# Patient Record
Sex: MALE | Race: BLACK OR AFRICAN AMERICAN | NOT HISPANIC OR LATINO | Employment: UNEMPLOYED | URBAN - METROPOLITAN AREA
[De-identification: names, ages, dates, MRNs, and addresses within clinical notes are randomized per-mention and may not be internally consistent; named-entity substitution may affect disease eponyms.]

---

## 2023-04-24 ENCOUNTER — HOSPITAL ENCOUNTER (EMERGENCY)
Facility: HOSPITAL | Age: 63
Discharge: HOME | End: 2023-04-25
Attending: EMERGENCY MEDICINE
Payer: COMMERCIAL

## 2023-04-24 DIAGNOSIS — L03.90 CELLULITIS, UNSPECIFIED CELLULITIS SITE: ICD-10-CM

## 2023-04-24 DIAGNOSIS — L02.11 NECK ABSCESS: Primary | ICD-10-CM

## 2023-04-24 LAB
ALBUMIN SERPL BCP-MCNC: 4 G/DL (ref 3.5–5.2)
ALP SERPL-CCNC: 70 U/L (ref 55–135)
ALT SERPL W/O P-5'-P-CCNC: 20 U/L (ref 10–44)
ANION GAP SERPL CALC-SCNC: 9 MMOL/L (ref 8–16)
AST SERPL-CCNC: 27 U/L (ref 10–40)
BASOPHILS # BLD AUTO: 0.04 K/UL (ref 0–0.2)
BASOPHILS NFR BLD: 0.4 % (ref 0–1.9)
BILIRUB SERPL-MCNC: 0.7 MG/DL (ref 0.1–1)
BUN SERPL-MCNC: 15 MG/DL (ref 8–23)
CALCIUM SERPL-MCNC: 9.5 MG/DL (ref 8.7–10.5)
CHLORIDE SERPL-SCNC: 105 MMOL/L (ref 95–110)
CO2 SERPL-SCNC: 21 MMOL/L (ref 23–29)
CREAT SERPL-MCNC: 1.1 MG/DL (ref 0.5–1.4)
DIFFERENTIAL METHOD: NORMAL
EOSINOPHIL # BLD AUTO: 0.1 K/UL (ref 0–0.5)
EOSINOPHIL NFR BLD: 0.9 % (ref 0–8)
ERYTHROCYTE [DISTWIDTH] IN BLOOD BY AUTOMATED COUNT: 12.1 % (ref 11.5–14.5)
EST. GFR  (NO RACE VARIABLE): >60 ML/MIN/1.73 M^2
GLUCOSE SERPL-MCNC: 97 MG/DL (ref 70–110)
HCT VFR BLD AUTO: 45.5 % (ref 40–54)
HGB BLD-MCNC: 15.8 G/DL (ref 14–18)
IMM GRANULOCYTES # BLD AUTO: 0.03 K/UL (ref 0–0.04)
IMM GRANULOCYTES NFR BLD AUTO: 0.3 % (ref 0–0.5)
LYMPHOCYTES # BLD AUTO: 3.1 K/UL (ref 1–4.8)
LYMPHOCYTES NFR BLD: 27.8 % (ref 18–48)
MCH RBC QN AUTO: 28.5 PG (ref 27–31)
MCHC RBC AUTO-ENTMCNC: 34.7 G/DL (ref 32–36)
MCV RBC AUTO: 82 FL (ref 82–98)
MONOCYTES # BLD AUTO: 0.8 K/UL (ref 0.3–1)
MONOCYTES NFR BLD: 7.4 % (ref 4–15)
NEUTROPHILS # BLD AUTO: 7 K/UL (ref 1.8–7.7)
NEUTROPHILS NFR BLD: 63.2 % (ref 38–73)
NRBC BLD-RTO: 0 /100 WBC
PLATELET # BLD AUTO: 235 K/UL (ref 150–450)
PMV BLD AUTO: 9.8 FL (ref 9.2–12.9)
POTASSIUM SERPL-SCNC: 4.9 MMOL/L (ref 3.5–5.1)
PROT SERPL-MCNC: 8.5 G/DL (ref 6–8.4)
RBC # BLD AUTO: 5.55 M/UL (ref 4.6–6.2)
SODIUM SERPL-SCNC: 135 MMOL/L (ref 136–145)
WBC # BLD AUTO: 10.98 K/UL (ref 3.9–12.7)

## 2023-04-24 PROCEDURE — 99285 EMERGENCY DEPT VISIT HI MDM: CPT | Mod: 25

## 2023-04-24 PROCEDURE — 85025 COMPLETE CBC W/AUTO DIFF WBC: CPT | Performed by: PHYSICIAN ASSISTANT

## 2023-04-24 PROCEDURE — 80053 COMPREHEN METABOLIC PANEL: CPT | Performed by: PHYSICIAN ASSISTANT

## 2023-04-24 PROCEDURE — 25500020 PHARM REV CODE 255: Performed by: EMERGENCY MEDICINE

## 2023-04-24 RX ADMIN — IOHEXOL 100 ML: 350 INJECTION, SOLUTION INTRAVENOUS at 10:04

## 2023-04-24 NOTE — Clinical Note
Mrs. Barnett accompanied their spouse to the emergency department on 4/24/2023. They may return to work on 04/26/2023.      If you have any questions or concerns, please don't hesitate to call.      Martha Dominguez RN

## 2023-04-24 NOTE — Clinical Note
Mago Mahendra accompanied their family member to the emergency department on 4/24/2023. They may return to work on 04/26/2023.      If you have any questions or concerns, please don't hesitate to call.      Martha Dominguez RN

## 2023-04-24 NOTE — Clinical Note
"Date: 4/25/2023  Patient: Surendra Barnett  Admitted: 4/24/2023  9:04 PM  Attending Provider: Juan Leach Jr., MD    Surendra Barnett or his authorized caregiver has made the decision for the patient to leave the emergency department against the advi ce of his attending physician. He or his authorized caregiver has been informed and understands the inherent risks, including death, worsening of neck swelling, airway obstruction, infection, DEATH.  He or his authorized caregiver has decided to acce pt the responsibility for this decision. Surendra Barnett and all necessary parties have been advised that he may return for further evaluation or treatment. His condition at time of discharge was stable.  Surendra Barnett had current vital signs as fo llows:  BP (!) 166/80   Pulse 69   Temp 99.1 °F (37.3 °C) (Oral)   Resp 18   Ht 6' 4" (1.93 m)   Wt 113 kg (249 lb 1.9 oz)   "

## 2023-04-24 NOTE — Clinical Note
Mrs. Barnett accompanied their spouse to the emergency department on 4/24/2023. They may return to work on 04/26/2023.      If you have any questions or concerns, please don't hesitate to call.      Martha Dominguez MD

## 2023-04-25 VITALS
OXYGEN SATURATION: 95 % | HEART RATE: 69 BPM | DIASTOLIC BLOOD PRESSURE: 86 MMHG | RESPIRATION RATE: 18 BRPM | SYSTOLIC BLOOD PRESSURE: 151 MMHG | TEMPERATURE: 99 F | WEIGHT: 249.13 LBS | HEIGHT: 76 IN | BODY MASS INDEX: 30.34 KG/M2

## 2023-04-25 PROBLEM — L02.11 NECK ABSCESS: Status: ACTIVE | Noted: 2023-04-25

## 2023-04-25 PROBLEM — L03.90 CELLULITIS: Status: ACTIVE | Noted: 2023-04-25

## 2023-04-25 PROCEDURE — 96365 THER/PROPH/DIAG IV INF INIT: CPT

## 2023-04-25 PROCEDURE — 96375 TX/PRO/DX INJ NEW DRUG ADDON: CPT

## 2023-04-25 PROCEDURE — 87040 BLOOD CULTURE FOR BACTERIA: CPT | Performed by: EMERGENCY MEDICINE

## 2023-04-25 PROCEDURE — 25000003 PHARM REV CODE 250: Performed by: EMERGENCY MEDICINE

## 2023-04-25 PROCEDURE — 63600175 PHARM REV CODE 636 W HCPCS: Performed by: EMERGENCY MEDICINE

## 2023-04-25 RX ORDER — DEXAMETHASONE SODIUM PHOSPHATE 4 MG/ML
4 INJECTION, SOLUTION INTRA-ARTICULAR; INTRALESIONAL; INTRAMUSCULAR; INTRAVENOUS; SOFT TISSUE
Status: COMPLETED | OUTPATIENT
Start: 2023-04-25 | End: 2023-04-25

## 2023-04-25 RX ORDER — METHYLPREDNISOLONE 4 MG/1
TABLET ORAL
Qty: 1 EACH | Refills: 0 | Status: SHIPPED | OUTPATIENT
Start: 2023-04-25

## 2023-04-25 RX ORDER — CLINDAMYCIN PHOSPHATE 900 MG/50ML
900 INJECTION, SOLUTION INTRAVENOUS
Status: COMPLETED | OUTPATIENT
Start: 2023-04-25 | End: 2023-04-25

## 2023-04-25 RX ORDER — CLINDAMYCIN HYDROCHLORIDE 150 MG/1
300 CAPSULE ORAL 4 TIMES DAILY
Qty: 56 CAPSULE | Refills: 0 | Status: SHIPPED | OUTPATIENT
Start: 2023-04-25 | End: 2023-05-02

## 2023-04-25 RX ADMIN — DEXAMETHASONE SODIUM PHOSPHATE 4 MG: 4 INJECTION INTRA-ARTICULAR; INTRALESIONAL; INTRAMUSCULAR; INTRAVENOUS; SOFT TISSUE at 12:04

## 2023-04-25 RX ADMIN — CLINDAMYCIN PHOSPHATE 900 MG: 900 INJECTION, SOLUTION INTRAVENOUS at 12:04

## 2023-04-25 NOTE — FIRST PROVIDER EVALUATION
Emergency Department TeleTriage Encounter Note      CHIEF COMPLAINT    Chief Complaint   Patient presents with    Neck Pain     Pt has some swelling to R side of neck. Happened today. Has not had any s/s prior to today. Airway intact.       VITAL SIGNS   Initial Vitals [04/24/23 1915]   BP Pulse Resp Temp SpO2   (!) 191/86 69 14 99.1 °F (37.3 °C) 97 %      MAP       --            ALLERGIES    Review of patient's allergies indicates:  No Known Allergies    PROVIDER TRIAGE NOTE  Patient presents with complaint of swelling to rigth side of neck with associated pain. Reports no fever. No trauma.      Phy:   Constitutional: well nourished, well developed, appearing stated age, NAD   HEENT: NCAT, symmetrical lids, No obvious facial deformity.  Normal phonation. Normal Conjunctiva   Neck: NAROM   Respiratory: Normal effort.  No obvious use of accessory muscles   Musculoskeletal: Moved upper extremities well   Neuro: Alert, answers questions appropriately    Psych: appropriate mood and affect      Initial orders will be placed and care will be transferred to an alternate provider when patient is roomed for a full evaluation. Any additional orders and the final disposition will be determined by that provider.        ORDERS  Labs Reviewed - No data to display    ED Orders (720h ago, onward)      Start Ordered     Status Ordering Provider    04/24/23 1954 04/24/23 1953  CBC auto differential  STAT         Ordered JOSÉ LUIS SANFORD    04/24/23 1954 04/24/23 1953  Comprehensive metabolic panel  STAT         Ordered JOSÉ LUIS SANFORD              Virtual Visit Note: The provider triage portion of this emergency department evaluation and documentation was performed via [employer], a HIPAA-compliant telemedicine application, in concert with a tele-presenter in the room. A face to face patient evaluation with one of my colleagues will occur once the patient is placed in an emergency department  room.      DISCLAIMER: This note was prepared with Spot[employer] voice recognition transcription software. Garbled syntax, mangled pronouns, and other bizarre constructions may be attributed to that software system.

## 2023-04-25 NOTE — ED PROVIDER NOTES
SCRIBE #1 NOTE: I, Pat Rolle, am scribing for, and in the presence of, Juan Leach Jr., MD. I have scribed the entire note.       History     Chief Complaint   Patient presents with    Neck Pain     Pt has some swelling to R side of neck. Happened today. Has not had any s/s prior to today. Airway intact.     Review of patient's allergies indicates:  No Known Allergies      History of Present Illness     HPI    4/24/2023, 9:06 PM  History obtained from the patient      History of Present Illness: Surendra Barnett is a 62 y.o. male patient who presents to the Emergency Department for evaluation of neck pain and swelling which onset this morning. Symptoms are constant and moderate in severity. No mitigating or exacerbating factors reported. Associated sxs include trouble swallowing. Patient denies any sore throat, rhinorrhea, CP, abdominal pain, N/V, and all other sxs at this time. No prior Tx reported. No further complaints or concerns at this time.       Arrival mode: Personal vehicle     PCP: Humberto Mullins MD        Past Medical History:  No past medical history on file.    Past Surgical History:  No past surgical history on file.      Family History:  No family history on file.    Social History:  Social History     Tobacco Use    Smoking status: Every Day     Types: Cigarettes    Smokeless tobacco: Current   Substance and Sexual Activity    Alcohol use: Not on file    Drug use: Not on file    Sexual activity: Not on file        Review of Systems     Review of Systems   Constitutional:  Negative for fever.   HENT:  Positive for trouble swallowing. Negative for rhinorrhea and sore throat.    Respiratory:  Negative for shortness of breath.    Cardiovascular:  Negative for chest pain.   Gastrointestinal:  Negative for abdominal pain, nausea and vomiting.   Genitourinary:  Negative for dysuria.   Musculoskeletal:  Positive for neck pain (+ swelling). Negative for back pain.   Skin:  Negative for rash.  "  Neurological:  Negative for weakness.   Hematological:  Does not bruise/bleed easily.   All other systems reviewed and are negative.     Physical Exam     Initial Vitals [04/24/23 1915]   BP Pulse Resp Temp SpO2   (!) 191/86 69 14 99.1 °F (37.3 °C) 97 %      MAP       --          Physical Exam  Nursing Notes and Vital Signs Reviewed.  Constitutional: Patient is in no apparent distress. Well-developed and well-nourished.  Head: Atraumatic. Normocephalic.  Eyes: PERRL. EOM intact. Conjunctivae are not pale. No scleral icterus.  ENT: Able to manage oral secretions. Mucous membranes are moist. Oropharynx is clear and symmetric.    Neck: Right anterior neck mass, 3 x 5 cm, tender, skin intact. Supple. Full ROM. No lymphadenopathy. Airways intact.   Cardiovascular: Regular rate. Regular rhythm. No murmurs, rubs, or gallops. Distal pulses are 2+ and symmetric.  Pulmonary/Chest: No respiratory distress. Clear to auscultation bilaterally. No wheezing or rales.  Abdominal: Soft and non-distended.  There is no tenderness.  No rebound, guarding, or rigidity. Good bowel sounds.  Genitourinary: No CVA tenderness  Musculoskeletal: Moves all extremities. No obvious deformities. No edema. No calf tenderness.  Skin: Warm and dry.  Neurological:  Alert, awake, and appropriate.  Normal speech.  No acute focal neurological deficits are appreciated.  Psychiatric: Normal affect. Good eye contact. Appropriate in content.     ED Course   Procedures  ED Vital Signs:  Vitals:    04/24/23 1915 04/24/23 2004 04/24/23 2112 04/24/23 2202   BP: (!) 191/86 (!) 164/80 (!) 175/85 (!) 154/71   Pulse: 69 64 67 62   Resp: 14 18 17 16   Temp: 99.1 °F (37.3 °C)      TempSrc: Oral      SpO2: 97% 97% 99% 95%   Weight: 113 kg (249 lb 1.9 oz)      Height: 6' 4" (1.93 m)       04/24/23 2302 04/25/23 0002 04/25/23 0102 04/25/23 0132   BP: (!) 166/80 (!) 153/80 (!) 173/85 (!) 151/86   Pulse: 69 66 64 69   Resp: 18      Temp:       TempSrc:       SpO2: 98% 96% " 97% 95%   Weight:       Height:           Abnormal Lab Results:  Labs Reviewed   COMPREHENSIVE METABOLIC PANEL - Abnormal; Notable for the following components:       Result Value    Sodium 135 (*)     CO2 21 (*)     Total Protein 8.5 (*)     All other components within normal limits   CULTURE, BLOOD   CULTURE, BLOOD   CBC W/ AUTO DIFFERENTIAL        All Lab Results:  Results for orders placed or performed during the hospital encounter of 04/24/23   Blood Culture #1 **CANNOT BE ORDERED STAT**    Specimen: Peripheral, Antecubital, Right; Blood   Result Value Ref Range    Blood Culture, Routine No Growth to date     Blood Culture, Routine No Growth to date     Blood Culture, Routine No Growth to date     Blood Culture, Routine No Growth to date    Blood Culture #2 **CANNOT BE ORDERED STAT**    Specimen: Peripheral, Forearm, Left; Blood   Result Value Ref Range    Blood Culture, Routine No Growth to date     Blood Culture, Routine No Growth to date     Blood Culture, Routine No Growth to date     Blood Culture, Routine No Growth to date    CBC auto differential   Result Value Ref Range    WBC 10.98 3.90 - 12.70 K/uL    RBC 5.55 4.60 - 6.20 M/uL    Hemoglobin 15.8 14.0 - 18.0 g/dL    Hematocrit 45.5 40.0 - 54.0 %    MCV 82 82 - 98 fL    MCH 28.5 27.0 - 31.0 pg    MCHC 34.7 32.0 - 36.0 g/dL    RDW 12.1 11.5 - 14.5 %    Platelets 235 150 - 450 K/uL    MPV 9.8 9.2 - 12.9 fL    Immature Granulocytes 0.3 0.0 - 0.5 %    Gran # (ANC) 7.0 1.8 - 7.7 K/uL    Immature Grans (Abs) 0.03 0.00 - 0.04 K/uL    Lymph # 3.1 1.0 - 4.8 K/uL    Mono # 0.8 0.3 - 1.0 K/uL    Eos # 0.1 0.0 - 0.5 K/uL    Baso # 0.04 0.00 - 0.20 K/uL    nRBC 0 0 /100 WBC    Gran % 63.2 38.0 - 73.0 %    Lymph % 27.8 18.0 - 48.0 %    Mono % 7.4 4.0 - 15.0 %    Eosinophil % 0.9 0.0 - 8.0 %    Basophil % 0.4 0.0 - 1.9 %    Differential Method Automated    Comprehensive metabolic panel   Result Value Ref Range    Sodium 135 (L) 136 - 145 mmol/L    Potassium 4.9 3.5 -  5.1 mmol/L    Chloride 105 95 - 110 mmol/L    CO2 21 (L) 23 - 29 mmol/L    Glucose 97 70 - 110 mg/dL    BUN 15 8 - 23 mg/dL    Creatinine 1.1 0.5 - 1.4 mg/dL    Calcium 9.5 8.7 - 10.5 mg/dL    Total Protein 8.5 (H) 6.0 - 8.4 g/dL    Albumin 4.0 3.5 - 5.2 g/dL    Total Bilirubin 0.7 0.1 - 1.0 mg/dL    Alkaline Phosphatase 70 55 - 135 U/L    AST 27 10 - 40 U/L    ALT 20 10 - 44 U/L    Anion Gap 9 8 - 16 mmol/L    eGFR >60 >60 mL/min/1.73 m^2         Imaging Results:  Imaging Results              CT Soft Tissue Neck With Contrast (Final result)  Result time 04/24/23 23:25:14      Final result by Karthikeyan Rodas MD (04/24/23 23:25:14)                   Impression:      As above    All CT scans at this facility are performed  using dose modulation techniques as appropriate to performed exam including the following:  automated exposure control; adjustment of mA and/or kV according to the patients size (this includes techniques or standardized protocols for targeted exams where dose is matched to indication/reason for exam: i.e. extremities or head);  iterative reconstruction technique.      Electronically signed by: Karthikeyan Rodas  Date:    04/24/2023  Time:    23:25               Narrative:    EXAMINATION:  CT SOFT TISSUE NECK WITH CONTRAST    CLINICAL HISTORY:  Neck abscess, deep tissue;    TECHNIQUE:  Low dose axial images as well as sagittal and coronal reconstructions were performed from the skull base to the clavicles following the intravenous administration of 75 mL of Omnipaque 350.    COMPARISON:  None    FINDINGS:  There is fluid in the right parapharyngeal space suggestive of an abscess or inflammatory infectious process.  There is mild thickening of the right platysmas and mild fat stranding adjacent to the submandibular gland which appears edematous.  Right neck subcutaneous fat stranding extending to the level of the thyroid cartilage.  Asymmetric enlargement of the right submandibular gland.  No submandibular  stones.  Mild mucosal thickening of the sphenoid sinuses.  Cervical hardware with anterior fusion C5-C6.  Multiple subcutaneous cyst involving posterior subcutaneous fat stranding.  Fluid adjacent to the right Jammie epiglottic region at the level the hyoid bone suggestive of infectious process/abscess.                                                The Emergency Provider reviewed the vital signs and test results, which are outlined above.     ED Discussion     12:17 AM: Discussed pt's case with Dr. Medrano (Otolaryngology) who says that there is nothing to act on surgically at this time. Dr. Medrano suggests admission to obs.     12:34 AM: Discussed case with Micha Dahl MD ([facility]). Dr. Dahl agrees with current care and management of pt and accepts admission.   Admitting Service: [facility]  Admitting Physician: Dr. Dahl  Admit to: Inpatient med/tele    12:34 AM: Re-evaluated pt. I have discussed test results, shared treatment plan, and the need for admission with patient and family at bedside.     1:03 AM: Pt is A&O x3, appropriate, and of capacity at this time. Pt voices desire to leave hospital. D/w pt in length need for further evaluation and treatment due to HPI and PEx. Pt declines any further evaluation or tx at this time. All risks, including worsening sx, permanent bodily harm and death, were discussed in length. Pt acknowledges all risk at this time and agrees to sign AMA form. Pt given RTER instructions. All questions and concerns addressed at this time. Pt leaving AMA.     The patient is over the age of 18 years, exhibits no evidence of an altered level of consciousness, and possesses appropriate decision-making capacity based on their ability to understand and communicate rationally.  Patient was advised, that, for an optimal recovery, they should be responsible for complying with the individualized treatment plan provided today by the medical professionals at [facility].   This includes taking medications as directed, reviewing and understanding all discharge instructions, and scheduling any appointments that were recommended.  I informed the patient that failing to take responsibility for their health and safety and not complying with the recommendations provided to today is deemed to be against our medical advice. The patient was provided clear and verbal details regarding alternatives, benefits, risks, and complications related to their condition. In addition, we reiterated the seriousness of their condition and our recommendations for urgent follow-up care with a qualified healthcare provider capable of addressing their specific needs.  Furthermore, the patient was made aware of the serious complications that may be associated with their condition, including: stroke, permanent disability, paralysis, loss of limb(s), and death.  I have personally explained to the patient that choosing to leave against medical advise may result in permanent bodily harm or death. I again discussed at great length that without further evaluation and monitoring there may be unforeseen circumstances and/or deterioration causing permanent bodily harm or death as a result of his/her choice. The patient verbalized these risks back to me in laymans terms.  The patient was able to discuss the treatment that was recommended and, was aware of specific risks associated with the refusal of care relating to their specific condition.  Patient was instructed to return to the emergency department if their condition changes or they wish to comply with our treatment recommendations.        Medical Decision Making:   Clinical Tests:   Lab Tests: Ordered and Reviewed  Radiological Study: Ordered and Reviewed         ED Medication(s):  Medications   iohexoL (OMNIPAQUE 350) injection 100 mL (100 mLs Intravenous Given 4/24/23 3715)   dexAMETHasone injection 4 mg (4 mg Intravenous Given 4/25/23 0040)   clindamycin in D5W  900 mg/50 mL IVPB 900 mg (0 mg Intravenous Stopped 4/25/23 0145)       Discharge Medication List as of 4/25/2023  1:12 AM        START taking these medications    Details   clindamycin (CLEOCIN) 150 MG capsule Take 2 capsules (300 mg total) by mouth 4 (four) times daily. for 7 days, Starting Tue 4/25/2023, Until Tue 5/2/2023, Print      methylPREDNISolone (MEDROL DOSEPACK) 4 mg tablet use as directed, Print              Follow-up Information       [facility]. Schedule an appointment as soon as possible for a visit in 1 day.    Specialty: Otolaryngology  Contact information:  [address] [facility] [address]  [phone]             Humberto Mullins MD. Schedule an appointment as soon as possible for a visit in 1 day.    Specialty: Family Medicine  Contact information:  [address] [facility] [address]  [phone]               [facility] - Emergency Dept..    Specialty: Emergency Medicine  Why: As needed, If symptoms worsen  Contact information:  [address] [facility] [address] [phone]                               Scribe Attestation:   Scribe #1: I performed the above scribed service and the documentation accurately describes the services I performed. I attest to the accuracy of the note.     Attending:   Physician Attestation Statement for Scribe #1: I, Juan Leach Jr., MD, personally performed the services described in this documentation, as scribed by Pat Rolle, in my presence, and it is both accurate and complete.           Clinical Impression       ICD-10-CM ICD-9-CM   1. Neck abscess  L02.11 682.1   2. Cellulitis, unspecified cellulitis site  L03.90 682.9       Disposition:   Disposition: AMA  Condition: Stable       Juan Leach Jr., MD  04/28/23 1958

## 2023-04-26 ENCOUNTER — OFFICE VISIT (OUTPATIENT)
Dept: OTOLARYNGOLOGY | Facility: CLINIC | Age: 63
End: 2023-04-26
Payer: COMMERCIAL

## 2023-04-26 VITALS — WEIGHT: 247.81 LBS | BODY MASS INDEX: 30.16 KG/M2

## 2023-04-26 DIAGNOSIS — K11.5 SIALOLITHIASIS OF SUBMANDIBULAR GLAND: Primary | ICD-10-CM

## 2023-04-26 DIAGNOSIS — K11.20 SIALOADENITIS OF SUBMANDIBULAR GLAND: ICD-10-CM

## 2023-04-26 PROCEDURE — 3008F PR BODY MASS INDEX (BMI) DOCUMENTED: ICD-10-PCS | Mod: CPTII,S$GLB,, | Performed by: OTOLARYNGOLOGY

## 2023-04-26 PROCEDURE — 99204 PR OFFICE/OUTPT VISIT, NEW, LEVL IV, 45-59 MIN: ICD-10-PCS | Mod: 25,S$GLB,, | Performed by: OTOLARYNGOLOGY

## 2023-04-26 PROCEDURE — 1159F MED LIST DOCD IN RCRD: CPT | Mod: CPTII,S$GLB,, | Performed by: OTOLARYNGOLOGY

## 2023-04-26 PROCEDURE — 42335 PR REMOVAL SUBMAND STONE,COMPLICATED: ICD-10-PCS | Mod: S$GLB,,, | Performed by: OTOLARYNGOLOGY

## 2023-04-26 PROCEDURE — 99204 OFFICE O/P NEW MOD 45 MIN: CPT | Mod: 25,S$GLB,, | Performed by: OTOLARYNGOLOGY

## 2023-04-26 PROCEDURE — 42335 REMOVAL OF SALIVARY STONE: CPT | Mod: S$GLB,,, | Performed by: OTOLARYNGOLOGY

## 2023-04-26 PROCEDURE — 99999 PR PBB SHADOW E&M-EST. PATIENT-LVL II: CPT | Mod: PBBFAC,,, | Performed by: OTOLARYNGOLOGY

## 2023-04-26 PROCEDURE — 99999 PR PBB SHADOW E&M-EST. PATIENT-LVL II: ICD-10-PCS | Mod: PBBFAC,,, | Performed by: OTOLARYNGOLOGY

## 2023-04-26 PROCEDURE — 3008F BODY MASS INDEX DOCD: CPT | Mod: CPTII,S$GLB,, | Performed by: OTOLARYNGOLOGY

## 2023-04-26 PROCEDURE — 1159F PR MEDICATION LIST DOCUMENTED IN MEDICAL RECORD: ICD-10-PCS | Mod: CPTII,S$GLB,, | Performed by: OTOLARYNGOLOGY

## 2023-04-26 NOTE — PROGRESS NOTES
Referring Provider:    Humberto Mullins Md  [address] [facility]  [place] [address]  Subjective:   Patient: Surendra Barnett [id number], :1960   Visit date:2023 12:01 PM    Chief Complaint:  throat (Irritation in throat /Feels a knot , notice it on Monday )    HPI:    Prior notes reviewed by myself.  Clinical documentation obtained by nursing staff reviewed.      62-year-old gentleman presents for evaluation of right-sided neck swelling.  He was seen in the emergency department and had a CT scan that demonstrated inflammation around his right submandibular gland and possible parapharyngeal abscess.  It was recommended that he be admitted for IV antibiotics, but he decided to leave AMA on oral antibiotics.  He continues to have the same symptoms including neck swelling, odynophagia, neck pain.  He has not had any prior episodes      Objective:     Physical Exam:  Vitals:  Wt 112.4 kg (247 lb 12.8 oz)   BMI 30.16 kg/m²   General appearance:  Well developed, well nourished    Ears:  Otoscopy of external auditory canals and tympanic membranes was normal, clinical speech reception thresholds grossly intact, no mass/lesion of auricle.    Nose:  No masses/lesions of external nose, nasal mucosa, septum, and turbinates were within normal limits.    Mouth:  No mass/lesion of lips, teeth, gums, hard/soft palate, tongue, tonsils, or oropharynx. Palpable salivary stone blocking the puncta of the right submandibular duct, distension of duct visible with edema.    Neck & Lymphatics:  No cervical lymphadenopathy, enlarged and tender right submandibular gland, trachea is midline, no thyroid enlargement/tenderness/mass.        [x]  Data Reviewed:    Lab Results   Component Value Date    WBC 10.98 2023    HGB 15.8 2023    HCT 45.5 2023    MCV 82 2023    EOSINOPHIL 0.9 2023     Procedure:  Removal right submandibular salivary stone   Preprocedure Diagnosis:  submandibular  sialolithiasis  Postprocedure Diagnosis:    Same  Findings:  15 cc of purulent draiange  Complications:  None  Blood loss:  Minimal    Procedure in detail:  After written consent was obtained,  the right floor of mouth near the area of the submandibular duct was infiltrated with approximately 0.25 cc of 1% lidocaine with epinephrine.  After local anesthesia was achieved, the duct was grasped with Hodges's forceps and curved iris scissors were used to open the duct at which time the stone was expelled into the oral cavity and removed.  Copious turbid saliva was then expressed from the duct.  The patient tolerated the procedure well, and there were no complications.    Independent review:  Inflammation/infection within right SM gland and surrounding tissues.  CT Soft Tissue Neck With Contrast  Order: [id number]  Status: Final result     Visible to patient: No (inaccessible in Patient Portal)     Next appt: 05/10/2023 at 08:15 AM in Otolaryngology (Osei Grigsby MD)     0 Result Notes  Details    Reading Physician Reading Date Result Priority   Karthikeyan Rodas MD  [phone] 4/24/2023 STAT     Narrative & Impression  EXAMINATION:  CT SOFT TISSUE NECK WITH CONTRAST     CLINICAL HISTORY:  Neck abscess, deep tissue;     TECHNIQUE:  Low dose axial images as well as sagittal and coronal reconstructions were performed from the skull base to the clavicles following the intravenous administration of 75 mL of Omnipaque 350.     COMPARISON:  None     FINDINGS:  There is fluid in the right parapharyngeal space suggestive of an abscess or inflammatory infectious process.  There is mild thickening of the right platysmas and mild fat stranding adjacent to the submandibular gland which appears edematous.  Right neck subcutaneous fat stranding extending to the level of the thyroid cartilage.  Asymmetric enlargement of the right submandibular gland.  No submandibular stones.  Mild mucosal thickening of the sphenoid sinuses.  Cervical  hardware with anterior fusion C5-C6.  Multiple subcutaneous cyst involving posterior subcutaneous fat stranding.  Fluid adjacent to the right Jammie epiglottic region at the level the hyoid bone suggestive of infectious process/abscess.     Impression:     As above     All CT scans at this facility are performed  using dose modulation techniques as appropriate to performed exam including the following:  automated exposure control; adjustment of mA and/or kV according to the patients size (this includes techniques or standardized protocols for targeted exams where dose is matched to indication/reason for exam: i.e. extremities or head);  iterative reconstruction technique.        Electronically signed by: Karthikeyan Rodas  Date:                                            04/24/2023  Time:                                           23:25           Exam Ended: 04/24/23 22:47 Last Resulted: 04/24/23 23:25           Assessment & Plan:   Sialolithiasis of submandibular gland [K11.5 (ICD-10-CM)]    Sialoadenitis of submandibular gland       He had a small sialolith that was obstructing his right submandibular duct.  We performed a sialolith autonomy and remove this stone which decompressed his right submandibular gland.  He experienced immediate relief.  He already has a prescription for oral antibiotics which I instructed him to continue.  We also discussed conservative treatment for sialoadenitis as noted below.  He will follow-up for recheck.    His exam today is consistent with sialoadenitis of the right submandibular.  We also discussed conservative measures to help treat sialoadenitis, including massage, warm compresses, aggressive hydration, and oral sialogogues (lemon drops or peppermints).  I will see the patient back in 2-3 weeks with a CT to evaluate their progress.  If their condition worsens in any way, the will return to the ER or come see me for a sooner appointment.

## 2023-04-30 LAB
BACTERIA BLD CULT: NORMAL
BACTERIA BLD CULT: NORMAL

## 2023-05-10 ENCOUNTER — OFFICE VISIT (OUTPATIENT)
Dept: OTOLARYNGOLOGY | Facility: CLINIC | Age: 63
End: 2023-05-10
Payer: COMMERCIAL

## 2023-05-10 VITALS — HEIGHT: 76 IN | TEMPERATURE: 99 F | BODY MASS INDEX: 30.18 KG/M2 | WEIGHT: 247.81 LBS

## 2023-05-10 DIAGNOSIS — K11.20 SIALOADENITIS OF SUBMANDIBULAR GLAND: ICD-10-CM

## 2023-05-10 DIAGNOSIS — K11.5 SIALOLITHIASIS OF SUBMANDIBULAR GLAND: Primary | ICD-10-CM

## 2023-05-10 PROCEDURE — 99999 PR PBB SHADOW E&M-EST. PATIENT-LVL II: CPT | Mod: PBBFAC,,, | Performed by: OTOLARYNGOLOGY

## 2023-05-10 PROCEDURE — 3008F PR BODY MASS INDEX (BMI) DOCUMENTED: ICD-10-PCS | Mod: CPTII,S$GLB,, | Performed by: OTOLARYNGOLOGY

## 2023-05-10 PROCEDURE — 1159F MED LIST DOCD IN RCRD: CPT | Mod: CPTII,S$GLB,, | Performed by: OTOLARYNGOLOGY

## 2023-05-10 PROCEDURE — 1159F PR MEDICATION LIST DOCUMENTED IN MEDICAL RECORD: ICD-10-PCS | Mod: CPTII,S$GLB,, | Performed by: OTOLARYNGOLOGY

## 2023-05-10 PROCEDURE — 99999 PR PBB SHADOW E&M-EST. PATIENT-LVL II: ICD-10-PCS | Mod: PBBFAC,,, | Performed by: OTOLARYNGOLOGY

## 2023-05-10 PROCEDURE — 99024 PR POST-OP FOLLOW-UP VISIT: ICD-10-PCS | Mod: S$GLB,,, | Performed by: OTOLARYNGOLOGY

## 2023-05-10 PROCEDURE — 99024 POSTOP FOLLOW-UP VISIT: CPT | Mod: S$GLB,,, | Performed by: OTOLARYNGOLOGY

## 2023-05-10 PROCEDURE — 3008F BODY MASS INDEX DOCD: CPT | Mod: CPTII,S$GLB,, | Performed by: OTOLARYNGOLOGY

## 2023-05-10 NOTE — PROGRESS NOTES
"Referring Provider:    No referring provider defined for this encounter.  Subjective:   Patient: Surendra Barnett [id number], :1960   Visit date:5/10/2023 12:01 PM    Chief Complaint:  Follow-up (Salivary sone f/u )    HPI:    Prior notes reviewed by myself.  Clinical documentation obtained by nursing staff reviewed.      62-year-old gentleman presents for evaluation of right-sided neck swelling.  He was seen in the emergency department and had a CT scan that demonstrated inflammation around his right submandibular gland and possible parapharyngeal abscess.  It was recommended that he be admitted for IV antibiotics, but he decided to leave AMA on oral antibiotics.  He continues to have the same symptoms including neck swelling, odynophagia, neck pain.  He has not had any prior episodes      Objective:     Physical Exam:  Vitals:  Temp 98.7 °F (37.1 °C)   Ht 6' 4" (1.93 m)   Wt 112.4 kg (247 lb 12.8 oz)   BMI 30.16 kg/m²   General appearance:  Well developed, well nourished    Ears:  Otoscopy of external auditory canals and tympanic membranes was normal, clinical speech reception thresholds grossly intact, no mass/lesion of auricle.    Nose:  No masses/lesions of external nose, nasal mucosa, septum, and turbinates were within normal limits.    Mouth:  No mass/lesion of lips, teeth, gums, hard/soft palate, tongue, tonsils, or oropharynx. Floor of mouth edema has resolved and he has clear saliva coming from both salivary ducts.    Neck & Lymphatics:  No cervical lymphadenopathy, swelling of submandibular gland has resolved, trachea is midline, no thyroid enlargement/tenderness/mass.        [x]  Data Reviewed:    Lab Results   Component Value Date    WBC 10.98 2023    HGB 15.8 2023    HCT 45.5 2023    MCV 82 2023    EOSINOPHIL 0.9 2023     Procedure:  Removal right submandibular salivary stone   Preprocedure Diagnosis:  submandibular sialolithiasis  Postprocedure Diagnosis:    " Same  Findings:  15 cc of purulent draiange  Complications:  None  Blood loss:  Minimal    Procedure in detail:  After written consent was obtained,  the right floor of mouth near the area of the submandibular duct was infiltrated with approximately 0.25 cc of 1% lidocaine with epinephrine.  After local anesthesia was achieved, the duct was grasped with Hodges's forceps and curved iris scissors were used to open the duct at which time the stone was expelled into the oral cavity and removed.  Copious turbid saliva was then expressed from the duct.  The patient tolerated the procedure well, and there were no complications.    Independent review:  Inflammation/infection within right SM gland and surrounding tissues.  CT Soft Tissue Neck With Contrast  Order: [id number]  Status: Final result     Visible to patient: No (inaccessible in Patient Portal)     Next appt: 05/10/2023 at 08:15 AM in Otolaryngology (Osei Grigsby MD)     0 Result Notes  Details    Reading Physician Reading Date Result Priority   Karthikeyan Rodas MD  [phone] 4/24/2023 STAT     Narrative & Impression  EXAMINATION:  CT SOFT TISSUE NECK WITH CONTRAST     CLINICAL HISTORY:  Neck abscess, deep tissue;     TECHNIQUE:  Low dose axial images as well as sagittal and coronal reconstructions were performed from the skull base to the clavicles following the intravenous administration of 75 mL of Omnipaque 350.     COMPARISON:  None     FINDINGS:  There is fluid in the right parapharyngeal space suggestive of an abscess or inflammatory infectious process.  There is mild thickening of the right platysmas and mild fat stranding adjacent to the submandibular gland which appears edematous.  Right neck subcutaneous fat stranding extending to the level of the thyroid cartilage.  Asymmetric enlargement of the right submandibular gland.  No submandibular stones.  Mild mucosal thickening of the sphenoid sinuses.  Cervical hardware with anterior fusion C5-C6.   Multiple subcutaneous cyst involving posterior subcutaneous fat stranding.  Fluid adjacent to the right Jammie epiglottic region at the level the hyoid bone suggestive of infectious process/abscess.     Impression:     As above     All CT scans at this facility are performed  using dose modulation techniques as appropriate to performed exam including the following:  automated exposure control; adjustment of mA and/or kV according to the patients size (this includes techniques or standardized protocols for targeted exams where dose is matched to indication/reason for exam: i.e. extremities or head);  iterative reconstruction technique.        Electronically signed by: Karthikeyan Rodas  Date:                                            04/24/2023  Time:                                           23:25           Exam Ended: 04/24/23 22:47 Last Resulted: 04/24/23 23:25           Assessment & Plan:   Sialolithiasis of submandibular gland [K11.5 (ICD-10-CM)]    Sialoadenitis of submandibular gland         He had a small sialolith that was obstructing his right submandibular duct.  We performed a sialolith autonomy and remove this stone which decompressed his right submandibular gland.  He experienced immediate relief.  He already has a prescription for oral antibiotics which I instructed him to continue.  We also discussed conservative treatment for sialoadenitis as noted below.  He will follow-up for recheck.    His exam today is consistent with sialoadenitis of the right submandibular.  We also discussed conservative measures to help treat sialoadenitis, including massage, warm compresses, aggressive hydration, and oral sialogogues (lemon drops or peppermints).  I will see the patient back in 2-3 weeks with a CT to evaluate their progress.  If their condition worsens in any way, the will return to the ER or come see me for a sooner appointment.    5/10/23 update:  All of his prior symptoms have resolved.  He has good flow from  both submandibular ducts.  Follow up p.r.n.